# Patient Record
Sex: FEMALE | Race: WHITE | URBAN - NONMETROPOLITAN AREA
[De-identification: names, ages, dates, MRNs, and addresses within clinical notes are randomized per-mention and may not be internally consistent; named-entity substitution may affect disease eponyms.]

---

## 2017-01-12 ENCOUNTER — OFFICE VISIT (OUTPATIENT)
Dept: FAMILY MEDICINE | Facility: CLINIC | Age: 52
End: 2017-01-12
Payer: COMMERCIAL

## 2017-01-12 VITALS
DIASTOLIC BLOOD PRESSURE: 78 MMHG | BODY MASS INDEX: 21.16 KG/M2 | TEMPERATURE: 98.1 F | RESPIRATION RATE: 20 BRPM | HEART RATE: 72 BPM | HEIGHT: 65 IN | WEIGHT: 127 LBS | SYSTOLIC BLOOD PRESSURE: 128 MMHG

## 2017-01-12 DIAGNOSIS — G89.29 CHRONIC MIDLINE LOW BACK PAIN WITH SCIATICA, SCIATICA LATERALITY UNSPECIFIED: ICD-10-CM

## 2017-01-12 DIAGNOSIS — G89.29 CHRONIC ABDOMINAL PAIN: ICD-10-CM

## 2017-01-12 DIAGNOSIS — K31.89 GASTRIC DYSMOTILITY: ICD-10-CM

## 2017-01-12 DIAGNOSIS — E89.0 POSTOPERATIVE HYPOTHYROIDISM: Primary | ICD-10-CM

## 2017-01-12 DIAGNOSIS — R10.9 CHRONIC ABDOMINAL PAIN: ICD-10-CM

## 2017-01-12 DIAGNOSIS — M54.40 CHRONIC MIDLINE LOW BACK PAIN WITH SCIATICA, SCIATICA LATERALITY UNSPECIFIED: ICD-10-CM

## 2017-01-12 DIAGNOSIS — G89.4 CHRONIC PAIN SYNDROME: ICD-10-CM

## 2017-01-12 DIAGNOSIS — F11.20 CONTINUOUS OPIOID DEPENDENCE (H): ICD-10-CM

## 2017-01-12 LAB — TSH SERPL DL<=0.005 MIU/L-ACNC: 0.83 MU/L (ref 0.4–4)

## 2017-01-12 PROCEDURE — 36415 COLL VENOUS BLD VENIPUNCTURE: CPT | Performed by: FAMILY MEDICINE

## 2017-01-12 PROCEDURE — 84443 ASSAY THYROID STIM HORMONE: CPT | Performed by: FAMILY MEDICINE

## 2017-01-12 PROCEDURE — 99214 OFFICE O/P EST MOD 30 MIN: CPT | Performed by: FAMILY MEDICINE

## 2017-01-12 RX ORDER — MORPHINE SULFATE 15 MG/1
15 TABLET, FILM COATED, EXTENDED RELEASE ORAL EVERY 12 HOURS
Qty: 90 TABLET | Refills: 0 | Status: SHIPPED | OUTPATIENT
Start: 2017-01-12 | End: 2017-01-12

## 2017-01-12 RX ORDER — MORPHINE SULFATE 15 MG/1
TABLET, FILM COATED, EXTENDED RELEASE ORAL
Qty: 60 TABLET | Refills: 0 | Status: SHIPPED | OUTPATIENT
Start: 2017-01-12

## 2017-01-12 RX ORDER — OXYCODONE HYDROCHLORIDE 10 MG/1
TABLET ORAL
Qty: 60 TABLET | Refills: 0 | Status: SHIPPED | OUTPATIENT
Start: 2017-01-12

## 2017-01-12 RX ORDER — OXYCODONE HYDROCHLORIDE 10 MG/1
TABLET ORAL
Qty: 60 TABLET | Refills: 0 | Status: SHIPPED | OUTPATIENT
Start: 2017-01-12 | End: 2017-01-12

## 2017-01-12 RX ORDER — MORPHINE SULFATE 15 MG/1
TABLET, FILM COATED, EXTENDED RELEASE ORAL
Qty: 90 TABLET | Refills: 0 | Status: SHIPPED | OUTPATIENT
Start: 2017-01-12

## 2017-01-12 NOTE — MR AVS SNAPSHOT
After Visit Summary   1/12/2017    Citlalli Davila    MRN: [id number]           Patient Information     Date Of Birth          1965        Visit Information        Provider Department      1/12/2017 2:00 PM Rodney High MD [facility]        Today's Diagnoses     Postoperative hypothyroidism    -  1     Chronic abdominal pain         Continuous opioid dependence (H)         Chronic pain syndrome         Chronic midline low back pain with sciatica, sciatica laterality unspecified         Gastric dysmotility            Follow-ups after your visit        Who to contact     If you have questions or need follow up information about today's clinic visit or your schedule please contact [facility] directly at [phone].  Normal or non-critical lab and imaging results will be communicated to you by MyChart, letter or phone within 4 business days after the clinic has received the results. If you do not hear from us within 7 days, please contact the clinic through [email or web address] or phone. If you have a critical or abnormal lab result, we will notify you by phone as soon as possible.  Submit refill requests through [facility] or call your pharmacy and they will forward the refill request to us. Please allow 3 business days for your refill to be completed.          Additional Information About Your Visit        MyChart Information     [facility] gives you secure access to your electronic health record. If you see a primary care provider, you can also send messages to your care team and make appointments. If you have questions, please call your primary care clinic.  If you do not have a primary care provider, please call [phone] and they will assist you.        Care EveryWhere ID     This is your Care EveryWhere ID. This could be used by other organizations to access your [facility] medical records  [id number]        Your Vitals Were     Pulse Temperature Respirations Height BMI  "(Body Mass Index) Last Period    72 98.1  F (36.7  C) (Tympanic) 20 5' 5\" (1.651 m) 21.13 kg/m2 12/01/1991    Breastfeeding?                   No            Blood Pressure from Last 3 Encounters:   01/12/17 128/78   11/14/16 150/82   08/19/16 138/84    Weight from Last 3 Encounters:   01/12/17 127 lb (57.607 kg)   11/14/16 127 lb (57.607 kg)   08/19/16 127 lb (57.607 kg)              We Performed the Following     TSH with free T4 reflex          Today's Medication Changes          These changes are accurate as of: 1/12/17  3:12 PM.  If you have any questions, ask your nurse or doctor.               Start taking these medicines.        Dose/Directions    oxyCODONE 10 MG IR tablet   Commonly known as:  ROXICODONE   Used for:  Chronic abdominal pain, Continuous opioid dependence (H), Chronic pain syndrome, Chronic midline low back pain with sciatica, sciatica laterality unspecified   Started by:  Rodney High MD        One tab bid prn pain   Quantity:  60 tablet   Refills:  0         These medicines have changed or have updated prescriptions.        Dose/Directions    * morphine 30 MG 12 hr tablet   Commonly known as:  MS CONTIN   This may have changed:  Another medication with the same name was added. Make sure you understand how and when to take each.   Used for:  Chronic abdominal pain, Continuous opioid dependence (H), Chronic pain syndrome, Chronic midline low back pain with sciatica, sciatica laterality unspecified   Changed by:  Rodney High MD        One tab bid   Quantity:  60 tablet   Refills:  0       * morphine 15 MG 12 hr tablet   Commonly known as:  MS CONTIN   This may have changed:  You were already taking a medication with the same name, and this prescription was added. Make sure you understand how and when to take each.   Used for:  Postoperative hypothyroidism   Changed by:  Rodney High MD        One tab bid   Quantity:  60 tablet   Refills:  0       * morphine 15 MG 12 hr tablet "   Commonly known as:  MS CONTIN   This may have changed:  You were already taking a medication with the same name, and this prescription was added. Make sure you understand how and when to take each.   Used for:  Postoperative hypothyroidism   Changed by:  Rodney High MD        2 tabs po in AM, 1 tab po in PM   Quantity:  90 tablet   Refills:  0       * Notice:  This list has 3 medication(s) that are the same as other medications prescribed for you. Read the directions carefully, and ask your doctor or other care provider to review them with you.         Where to get your medicines      Some of these will need a paper prescription and others can be bought over the counter.  Ask your nurse if you have questions.     Bring a paper prescription for each of these medications    - morphine 15 MG 12 hr tablet  - morphine 15 MG 12 hr tablet  - oxyCODONE 10 MG IR tablet             Primary Care Provider Office Phone # Fax #    Rodney High [phone]       [facility] [address] [place] [phone]        Thank you!     Thank you for choosing [facility]  for your care. Our goal is always to provide you with excellent care. Hearing back from our patients is one way we can continue to improve our services. Please take a few minutes to complete the written survey that you may receive in the mail after your visit with us. Thank you!             Your Updated Medication List - Protect others around you: Learn how to safely use, store and throw away your medicines at www.disposemymeds.org.          This list is accurate as of: 1/12/17  3:12 PM.  Always use your most recent med list.                   Brand Name Dispense Instructions for use    Levothyroxine Sodium 112 MCG Caps     30 capsule    Take 112 mcg by mouth daily       * morphine 30 MG 12 hr tablet    MS CONTIN    60 tablet    One tab bid       * morphine 15 MG 12 hr tablet    MS CONTIN    60 tablet    One  tab bid       * morphine 15 MG 12 hr tablet    MS CONTIN    90 tablet    2 tabs po in AM, 1 tab po in PM       oxyCODONE 10 MG IR tablet    ROXICODONE    60 tablet    One tab bid prn pain       PARoxetine 20 MG tablet    PAXIL     Take 40 mg by mouth       traZODone 100 MG tablet    DESYREL     TAKE THREE TO FOUR TABLETS BY MOUTH ONCE DAILY AT BEDTIME       ZOFRAN 4 MG tablet   Generic drug:  ondansetron      as needed       * Notice:  This list has 3 medication(s) that are the same as other medications prescribed for you. Read the directions carefully, and ask your doctor or other care provider to review them with you.

## 2017-01-12 NOTE — NURSING NOTE
"Chief Complaint   Patient presents with     Pain     med refills       Initial /78 mmHg  Pulse 72  Temp(Src) 98.1  F (36.7  C) (Tympanic)  Resp 20  Ht 5' 5\" (1.651 m)  Wt 127 lb (57.607 kg)  BMI 21.13 kg/m2  LMP 12/01/1991  Breastfeeding? No Estimated body mass index is 21.13 kg/(m^2) as calculated from the following:    Height as of this encounter: 5' 5\" (1.651 m).    Weight as of this encounter: 127 lb (57.607 kg).  BP completed using cuff size: mili Farnsworth, CMA    "

## 2017-01-12 NOTE — PROGRESS NOTES
"  SUBJECTIVE:                                                    Citlalli Davila is a 51 year old female who presents to clinic today for the following health issues:       Pain      Duration: Ongoing    Description (location/character/radiation): abdomen    Intensity:  7/10    Accompanying signs and symptoms: none    History (similar episodes/previous evaluation): previous episode    Precipitating or alleviating factors: None    Therapies tried and outcome: MS Contin, Oxycodone        S: Citlalli Davila is a 51 year old female with chronic pain.  Was hesitant to continue taper down last time, more willing now.  Currently on 30 bid on long acting morphine, with 10mg bid on short acting.  She has committed to idea of tapering off and seeing how she does.  Back and abd pain is her condition    Hypothyroid: more cold in general, wants to check labs   on replacement     Gastric dysmotility: see problem list.  She feels she's had looser stools with taper down on opioids.  Makes some sense    Problem list, med list, additional histories reviewed and updated, as indicated.      No cp or sob    O:/78 mmHg  Pulse 72  Temp(Src) 98.1  F (36.7  C) (Tympanic)  Resp 20  Ht 5' 5\" (1.651 m)  Wt 127 lb (57.607 kg)  BMI 21.13 kg/m2  LMP 12/01/1991  Breastfeeding? No  GEN: Alert and oriented, in no acute distress    A: chronic pain, continue taper       Opioid dependency       Hypothyroid, recheck         Dysmotility syndrome, worse?     P: 30 in am, 15 in PM for a month, then 15 in AM, 15 in PM another month  (for long acting opioid).   Continue the 10mg bid on short acting.  2 mo supply given    Check thyroid  If bowels worsening, may need to add lomotil or immodium.     Pain clinic if she's not tolerating taper.    "

## 2017-06-15 ENCOUNTER — TELEPHONE (OUTPATIENT)
Dept: FAMILY MEDICINE | Facility: CLINIC | Age: 52
End: 2017-06-15

## 2017-06-15 NOTE — LETTER
[facility]  [address]  [facility] [address]  Phone: [phone]    Katiuska 15, 2017    Citlalli Davila  [address]  [facility] [address]              Dear Ms. Davila,      In order to ensure we are providing the best quality care, we have reviewed your chart and see that you are due for a mammogram.  Attached is the mammogram schedule for the [facility]. Please call to schedule your mammogram at a location convenient for you.    We greatly appreciate the opportunity to serve you.  Thank you for trusting us with your health care.      Sincerely,    Your care team at [facility]    Dr. Rodney High

## 2018-10-10 ENCOUNTER — APPOINTMENT (OUTPATIENT)
Dept: OCCUPATIONAL MEDICINE | Facility: CLINIC | Age: 53
End: 2018-10-10

## 2018-10-10 PROCEDURE — 99000 SPECIMEN HANDLING OFFICE-LAB: CPT | Performed by: FAMILY MEDICINE

## 2019-09-29 ENCOUNTER — HEALTH MAINTENANCE LETTER (OUTPATIENT)
Age: 54
End: 2019-09-29

## 2020-03-15 ENCOUNTER — HEALTH MAINTENANCE LETTER (OUTPATIENT)
Age: 55
End: 2020-03-15

## 2021-01-14 ENCOUNTER — HEALTH MAINTENANCE LETTER (OUTPATIENT)
Age: 56
End: 2021-01-14

## 2021-05-08 ENCOUNTER — HEALTH MAINTENANCE LETTER (OUTPATIENT)
Age: 56
End: 2021-05-08

## 2021-05-24 ENCOUNTER — RECORDS - HEALTHEAST (OUTPATIENT)
Dept: ADMINISTRATIVE | Facility: CLINIC | Age: 56
End: 2021-05-24

## 2021-05-25 ENCOUNTER — RECORDS - HEALTHEAST (OUTPATIENT)
Dept: ADMINISTRATIVE | Facility: CLINIC | Age: 56
End: 2021-05-25

## 2021-05-26 ENCOUNTER — RECORDS - HEALTHEAST (OUTPATIENT)
Dept: ADMINISTRATIVE | Facility: CLINIC | Age: 56
End: 2021-05-26

## 2021-05-27 ENCOUNTER — RECORDS - HEALTHEAST (OUTPATIENT)
Dept: ADMINISTRATIVE | Facility: CLINIC | Age: 56
End: 2021-05-27

## 2021-05-28 ENCOUNTER — RECORDS - HEALTHEAST (OUTPATIENT)
Dept: ADMINISTRATIVE | Facility: CLINIC | Age: 56
End: 2021-05-28

## 2021-05-29 ENCOUNTER — RECORDS - HEALTHEAST (OUTPATIENT)
Dept: ADMINISTRATIVE | Facility: CLINIC | Age: 56
End: 2021-05-29

## 2021-05-30 ENCOUNTER — RECORDS - HEALTHEAST (OUTPATIENT)
Dept: ADMINISTRATIVE | Facility: CLINIC | Age: 56
End: 2021-05-30

## 2021-05-31 ENCOUNTER — RECORDS - HEALTHEAST (OUTPATIENT)
Dept: ADMINISTRATIVE | Facility: CLINIC | Age: 56
End: 2021-05-31

## 2021-06-01 ENCOUNTER — RECORDS - HEALTHEAST (OUTPATIENT)
Dept: ADMINISTRATIVE | Facility: CLINIC | Age: 56
End: 2021-06-01

## 2021-06-02 ENCOUNTER — RECORDS - HEALTHEAST (OUTPATIENT)
Dept: ADMINISTRATIVE | Facility: CLINIC | Age: 56
End: 2021-06-02

## 2021-06-03 ENCOUNTER — RECORDS - HEALTHEAST (OUTPATIENT)
Dept: ADMINISTRATIVE | Facility: CLINIC | Age: 56
End: 2021-06-03

## 2021-10-23 ENCOUNTER — HEALTH MAINTENANCE LETTER (OUTPATIENT)
Age: 56
End: 2021-10-23

## 2022-06-04 ENCOUNTER — HEALTH MAINTENANCE LETTER (OUTPATIENT)
Age: 57
End: 2022-06-04

## 2022-10-10 ENCOUNTER — HEALTH MAINTENANCE LETTER (OUTPATIENT)
Age: 57
End: 2022-10-10

## 2023-06-11 ENCOUNTER — HEALTH MAINTENANCE LETTER (OUTPATIENT)
Age: 58
End: 2023-06-11

## 2024-01-07 ENCOUNTER — HEALTH MAINTENANCE LETTER (OUTPATIENT)
Age: 59
End: 2024-01-07

## 2025-06-04 ENCOUNTER — TRANSCRIBE ORDERS (OUTPATIENT)
Dept: OTHER | Age: 60
End: 2025-06-04

## 2025-06-04 DIAGNOSIS — R21 RASH: Primary | ICD-10-CM

## 2025-06-09 ENCOUNTER — PATIENT OUTREACH (OUTPATIENT)
Dept: CARE COORDINATION | Facility: CLINIC | Age: 60
End: 2025-06-09
Payer: COMMERCIAL

## 2025-08-16 ENCOUNTER — HEALTH MAINTENANCE LETTER (OUTPATIENT)
Age: 60
End: 2025-08-16